# Patient Record
Sex: FEMALE | Race: WHITE
[De-identification: names, ages, dates, MRNs, and addresses within clinical notes are randomized per-mention and may not be internally consistent; named-entity substitution may affect disease eponyms.]

---

## 2017-02-03 ENCOUNTER — APPOINTMENT (OUTPATIENT)
Dept: FAMILY MEDICINE | Facility: CLINIC | Age: 62
End: 2017-02-03

## 2017-02-03 VITALS
HEART RATE: 76 BPM | BODY MASS INDEX: 19.9 KG/M2 | HEIGHT: 65 IN | SYSTOLIC BLOOD PRESSURE: 90 MMHG | OXYGEN SATURATION: 98 % | DIASTOLIC BLOOD PRESSURE: 66 MMHG | RESPIRATION RATE: 16 BRPM | TEMPERATURE: 98.2 F | WEIGHT: 119.44 LBS

## 2018-03-12 ENCOUNTER — APPOINTMENT (OUTPATIENT)
Dept: FAMILY MEDICINE | Facility: CLINIC | Age: 63
End: 2018-03-12
Payer: COMMERCIAL

## 2018-03-12 VITALS
HEIGHT: 65 IN | OXYGEN SATURATION: 98 % | SYSTOLIC BLOOD PRESSURE: 102 MMHG | WEIGHT: 120.25 LBS | DIASTOLIC BLOOD PRESSURE: 68 MMHG | RESPIRATION RATE: 16 BRPM | BODY MASS INDEX: 20.03 KG/M2 | HEART RATE: 68 BPM

## 2018-03-12 DIAGNOSIS — J06.9 ACUTE UPPER RESPIRATORY INFECTION, UNSPECIFIED: ICD-10-CM

## 2018-03-12 PROCEDURE — 99214 OFFICE O/P EST MOD 30 MIN: CPT

## 2018-03-12 RX ORDER — FLUTICASONE PROPIONATE 50 UG/1
50 SPRAY, METERED NASAL TWICE DAILY
Qty: 1 | Refills: 1 | Status: COMPLETED | COMMUNITY
Start: 2017-02-03 | End: 2018-03-12

## 2018-05-09 ENCOUNTER — FORM ENCOUNTER (OUTPATIENT)
Age: 63
End: 2018-05-09

## 2018-05-10 ENCOUNTER — APPOINTMENT (OUTPATIENT)
Dept: MAMMOGRAPHY | Facility: CLINIC | Age: 63
End: 2018-05-10

## 2018-05-10 ENCOUNTER — OUTPATIENT (OUTPATIENT)
Dept: OUTPATIENT SERVICES | Facility: HOSPITAL | Age: 63
LOS: 1 days | End: 2018-05-10
Payer: COMMERCIAL

## 2018-05-10 DIAGNOSIS — Z00.8 ENCOUNTER FOR OTHER GENERAL EXAMINATION: ICD-10-CM

## 2018-05-10 PROCEDURE — 77063 BREAST TOMOSYNTHESIS BI: CPT | Mod: 26

## 2018-05-10 PROCEDURE — 77063 BREAST TOMOSYNTHESIS BI: CPT

## 2018-05-10 PROCEDURE — 77067 SCR MAMMO BI INCL CAD: CPT | Mod: 26

## 2018-05-10 PROCEDURE — 77067 SCR MAMMO BI INCL CAD: CPT

## 2019-03-01 ENCOUNTER — APPOINTMENT (OUTPATIENT)
Dept: FAMILY MEDICINE | Facility: CLINIC | Age: 64
End: 2019-03-01
Payer: COMMERCIAL

## 2019-03-01 VITALS
HEIGHT: 65 IN | DIASTOLIC BLOOD PRESSURE: 70 MMHG | SYSTOLIC BLOOD PRESSURE: 118 MMHG | WEIGHT: 118.5 LBS | BODY MASS INDEX: 19.74 KG/M2 | RESPIRATION RATE: 16 BRPM | TEMPERATURE: 97.7 F | HEART RATE: 70 BPM | OXYGEN SATURATION: 100 %

## 2019-03-01 DIAGNOSIS — R05 COUGH: ICD-10-CM

## 2019-03-01 PROCEDURE — 99214 OFFICE O/P EST MOD 30 MIN: CPT

## 2019-03-03 NOTE — PHYSICAL EXAM
[No Acute Distress] : no acute distress [Well Nourished] : well nourished [Well Developed] : well developed [Well-Appearing] : well-appearing [Normal Voice/Communication] : normal voice/communication [Normal Sclera/Conjunctiva] : normal sclera/conjunctiva [Normal Outer Ear/Nose] : the outer ears and nose were normal in appearance [Normal TMs] : both tympanic membranes were normal [No JVD] : no jugular venous distention [No Respiratory Distress] : no respiratory distress  [Clear to Auscultation] : lungs were clear to auscultation bilaterally [No Accessory Muscle Use] : no accessory muscle use [Normal Rate] : normal rate  [Regular Rhythm] : with a regular rhythm [Normal S1, S2] : normal S1 and S2 [Normal Supraclavicular Nodes] : no supraclavicular lymphadenopathy [Normal Anterior Cervical Nodes] : no anterior cervical lymphadenopathy [Normal Gait] : normal gait [Speech Grossly Normal] : speech grossly normal [Memory Grossly Normal] : memory grossly normal [Normal Affect] : the affect was normal [Alert and Oriented x3] : oriented to person, place, and time [Normal Insight/Judgement] : insight and judgment were intact [de-identified] : sl erythema o/p, no tonsillar exudates

## 2019-03-03 NOTE — ASSESSMENT
[FreeTextEntry1] : continue supportive care\par Return to office if you feel worse or do not feel better\par

## 2019-03-03 NOTE — HISTORY OF PRESENT ILLNESS
[FreeTextEntry8] : Patient presents stating she started with flu like synpotoms 12 days ago, sore throat, chest congestion. Better in day than at night. No flu shot this year. No facial pain. +Nasal congestion. OTC: advil, nyquil. dry cough persists now, overall feeling better since start of illness. No fever, no n/v/d.  \par \par ROS: negative except as noted above\par

## 2021-01-16 ENCOUNTER — TRANSCRIPTION ENCOUNTER (OUTPATIENT)
Age: 66
End: 2021-01-16

## 2021-08-12 ENCOUNTER — APPOINTMENT (OUTPATIENT)
Dept: FAMILY MEDICINE | Facility: CLINIC | Age: 66
End: 2021-08-12
Payer: COMMERCIAL

## 2021-08-12 VITALS
HEART RATE: 73 BPM | TEMPERATURE: 97.6 F | WEIGHT: 112 LBS | RESPIRATION RATE: 16 BRPM | SYSTOLIC BLOOD PRESSURE: 120 MMHG | DIASTOLIC BLOOD PRESSURE: 80 MMHG | HEIGHT: 65 IN | BODY MASS INDEX: 18.66 KG/M2 | OXYGEN SATURATION: 97 %

## 2021-08-12 DIAGNOSIS — F41.9 ANXIETY DISORDER, UNSPECIFIED: ICD-10-CM

## 2021-08-12 PROCEDURE — 99214 OFFICE O/P EST MOD 30 MIN: CPT

## 2021-08-13 RX ORDER — BENZONATATE 100 MG/1
100 CAPSULE ORAL
Qty: 30 | Refills: 1 | Status: DISCONTINUED | COMMUNITY
Start: 2019-03-01 | End: 2021-08-13

## 2021-08-13 NOTE — PHYSICAL EXAM
[No Acute Distress] : no acute distress [Well Nourished] : well nourished [Well Developed] : well developed [Well-Appearing] : well-appearing [Normal Sclera/Conjunctiva] : normal sclera/conjunctiva [EOMI] : extraocular movements intact [Normal Outer Ear/Nose] : the outer ears and nose were normal in appearance [No Respiratory Distress] : no respiratory distress  [No Accessory Muscle Use] : no accessory muscle use [Clear to Auscultation] : lungs were clear to auscultation bilaterally [Normal Rate] : normal rate  [Regular Rhythm] : with a regular rhythm

## 2021-08-16 LAB
25(OH)D3 SERPL-MCNC: 74.8 NG/ML
ALBUMIN SERPL ELPH-MCNC: 5.2 G/DL
ALP BLD-CCNC: 96 U/L
ALT SERPL-CCNC: 31 U/L
ANION GAP SERPL CALC-SCNC: 12 MMOL/L
AST SERPL-CCNC: 34 U/L
BASOPHILS # BLD AUTO: 0.02 K/UL
BASOPHILS NFR BLD AUTO: 0.3 %
BILIRUB SERPL-MCNC: 0.8 MG/DL
BUN SERPL-MCNC: 19 MG/DL
CALCIUM SERPL-MCNC: 10.7 MG/DL
CHLORIDE SERPL-SCNC: 102 MMOL/L
CHOLEST SERPL-MCNC: 258 MG/DL
CO2 SERPL-SCNC: 26 MMOL/L
COVID-19 NUCLEOCAPSID  GAM ANTIBODY INTERPRETATION: POSITIVE
CREAT SERPL-MCNC: 0.71 MG/DL
EOSINOPHIL # BLD AUTO: 0.07 K/UL
EOSINOPHIL NFR BLD AUTO: 1 %
ESTIMATED AVERAGE GLUCOSE: 108 MG/DL
GLUCOSE SERPL-MCNC: 99 MG/DL
HBA1C MFR BLD HPLC: 5.4 %
HCT VFR BLD CALC: 43.5 %
HDLC SERPL-MCNC: 102 MG/DL
HGB BLD-MCNC: 14.4 G/DL
IMM GRANULOCYTES NFR BLD AUTO: 0.1 %
LDLC SERPL CALC-MCNC: 141 MG/DL
LYMPHOCYTES # BLD AUTO: 2.28 K/UL
LYMPHOCYTES NFR BLD AUTO: 31.4 %
MAN DIFF?: NORMAL
MCHC RBC-ENTMCNC: 30.8 PG
MCHC RBC-ENTMCNC: 33.1 GM/DL
MCV RBC AUTO: 93.1 FL
MONOCYTES # BLD AUTO: 0.63 K/UL
MONOCYTES NFR BLD AUTO: 8.7 %
NEUTROPHILS # BLD AUTO: 4.25 K/UL
NEUTROPHILS NFR BLD AUTO: 58.5 %
NONHDLC SERPL-MCNC: 155 MG/DL
PLATELET # BLD AUTO: 382 K/UL
POTASSIUM SERPL-SCNC: 5.1 MMOL/L
PROT SERPL-MCNC: 7.7 G/DL
RBC # BLD: 4.67 M/UL
RBC # FLD: 12.5 %
SARS-COV-2 AB SERPL QL IA: 68.5 INDEX
SODIUM SERPL-SCNC: 140 MMOL/L
TRIGL SERPL-MCNC: 71 MG/DL
WBC # FLD AUTO: 7.26 K/UL

## 2021-08-17 ENCOUNTER — NON-APPOINTMENT (OUTPATIENT)
Age: 66
End: 2021-08-17

## 2021-08-18 ENCOUNTER — NON-APPOINTMENT (OUTPATIENT)
Age: 66
End: 2021-08-18

## 2021-08-18 NOTE — PLAN
[FreeTextEntry1] : 66 year old female presents to re-establish care. \par \par #Health Maintenance\par Given mammogram referral and collected maintenance labs\par She will schedule follow-up appointment for a complete physical exam. \par \par #Travel\par sent refill of clonazepam 0.5 to use during flight. \par She uses medication only with travel. iSTOP was checked and cleared.\par \par #History of COVID\par Patient reports she had covid and received monoclonal antibody transfusion\par Sent for COVID nucleocapsid antibody test \par

## 2021-08-18 NOTE — HISTORY OF PRESENT ILLNESS
[FreeTextEntry8] : 66 years old female  [FreeTextEntry1] : Would like medication for travel and Covid antibody test. [de-identified] : 66 year old female presents to re-establish care. \par \par #Health Maintenance\par Needs mammogram and labs to review at next patient visit for CPE.\par \par #Travel\par Patient would like refill of clonazepam 0.5 to use during flight. \par She uses medication only with travel. She brought in pill bottle from 2019 that was prescribed for same indication.\par \par #History of COVID\par Patient reports she had covid and received monoclonal antibody transfusion\par She would like confirmation of antibody levels\par

## 2021-08-18 NOTE — REVIEW OF SYSTEMS
[Fever] : no fever [Chills] : no chills [Fatigue] : no fatigue [Night Sweats] : no night sweats [Recent Change In Weight] : ~T no recent weight change [Discharge] : no discharge [Shortness Of Breath] : no shortness of breath [Wheezing] : no wheezing [Cough] : no cough [Abdominal Pain] : no abdominal pain [Constipation] : no constipation [Diarrhea] : diarrhea [Vomiting] : no vomiting [Muscle Weakness] : no muscle weakness [Headache] : no headache [Dizziness] : no dizziness

## 2021-08-18 NOTE — HISTORY OF PRESENT ILLNESS
[FreeTextEntry8] : 66 years old female  [FreeTextEntry1] : Would like medication for travel and Covid antibody test. [de-identified] : 66 year old female presents to re-establish care. \par \par #Health Maintenance\par Needs mammogram and labs to review at next patient visit for CPE.\par \par #Travel\par Patient would like refill of clonazepam 0.5 to use during flight. \par She uses medication only with travel. She brought in pill bottle from 2019 that was prescribed for same indication.\par \par #History of COVID\par Patient reports she had covid and received monoclonal antibody transfusion\par She would like confirmation of antibody levels\par

## 2021-08-20 ENCOUNTER — NON-APPOINTMENT (OUTPATIENT)
Age: 66
End: 2021-08-20

## 2021-08-20 DIAGNOSIS — Z86.16 PERSONAL HISTORY OF COVID-19: ICD-10-CM

## 2021-08-20 DIAGNOSIS — Z00.00 ENCOUNTER FOR GENERAL ADULT MEDICAL EXAMINATION W/OUT ABNORMAL FINDINGS: ICD-10-CM

## 2021-08-20 DIAGNOSIS — B34.9 VIRAL INFECTION, UNSPECIFIED: ICD-10-CM

## 2023-04-03 ENCOUNTER — APPOINTMENT (OUTPATIENT)
Dept: FAMILY MEDICINE | Facility: CLINIC | Age: 68
End: 2023-04-03

## 2025-01-16 ENCOUNTER — APPOINTMENT (OUTPATIENT)
Dept: ORTHOPEDIC SURGERY | Facility: CLINIC | Age: 70
End: 2025-01-16
Payer: MEDICARE

## 2025-01-16 VITALS — HEIGHT: 64 IN | BODY MASS INDEX: 19.46 KG/M2 | WEIGHT: 114 LBS

## 2025-01-16 DIAGNOSIS — M16.12 UNILATERAL PRIMARY OSTEOARTHRITIS, LEFT HIP: ICD-10-CM

## 2025-01-16 PROCEDURE — 99205 OFFICE O/P NEW HI 60 MIN: CPT

## 2025-03-05 VITALS
HEART RATE: 68 BPM | HEIGHT: 64 IN | RESPIRATION RATE: 16 BRPM | SYSTOLIC BLOOD PRESSURE: 133 MMHG | WEIGHT: 112.44 LBS | TEMPERATURE: 98 F | OXYGEN SATURATION: 100 % | DIASTOLIC BLOOD PRESSURE: 75 MMHG

## 2025-03-05 RX ORDER — POVIDONE-IODINE 7.5 %
1 SOLUTION, NON-ORAL TOPICAL ONCE
Refills: 0 | Status: COMPLETED | OUTPATIENT
Start: 2025-03-06 | End: 2025-03-06

## 2025-03-05 NOTE — H&P ADULT - NSICDXPASTSURGICALHX_GEN_ALL_CORE_FT
PAST SURGICAL HISTORY:  H/O hernia repair 2024    H/O: hysterectomy     History of Rhinoplasty @ 32 y/o     History of Tonsillectomy 5 y/o     S/P Dilation and Curettage  in 30's

## 2025-03-05 NOTE — H&P ADULT - HISTORY OF PRESENT ILLNESS
70yo f c/o left hip pain x   Presents today for elective LEFT THR.   Has the patient used any narcotic more than 90 days prior to the date of surgery? YES or NO  70yo f c/o left hip pain x months. Reports tennis injury where she injured her hip. Attempted several conservative therapies including PT, activity modification. Patient taking antiinflammatories without relief. Patient ambulating with a cane for support. Denies h/o blood clots, recent hospitalization, use of antibiotics.   Presents today for elective left total hip replacement with Dr. Romero   Has the patient used any narcotic more than 90 days prior to the date of surgery?  NO

## 2025-03-05 NOTE — ASU PATIENT PROFILE, ADULT - FALL HARM RISK - RISK INTERVENTIONS

## 2025-03-05 NOTE — ASU PATIENT PROFILE, ADULT - NSICDXPASTMEDICALHX_GEN_ALL_CORE_FT
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

## 2025-03-05 NOTE — H&P ADULT - PROBLEM SELECTOR PLAN 1
Admit to Orthopaedic Service.  Presents today for elective LEFT THR   Pt medically stable and cleared for procedure today by Dr. Hameed

## 2025-03-05 NOTE — H&P ADULT - NSHPLABSRESULTS_GEN_ALL_CORE
preop cbc/bmp/coags/ua wnl per medical clearance  border line liver enzymes and LDL recommended to repeat labs   Cr 0.68  H/H 14.9/44.2  HGA1C 5.4  EKG- NSR

## 2025-03-05 NOTE — H&P ADULT - NSHPPHYSICALEXAM_GEN_ALL_CORE
General:  PE:  Decreased ROM secondary to pain. Rest of PE per medical clearance. Gen: 68 y/o female, NAD  MSK: decreased ROM 2/2 pain at the left hip     Motor: quad/TA/GS/EHL/FHl 5/5 bilateral lower extremities  Sensation: intact to light touch throughout bilateral lower extremities  Pulses: 2+ DP pulses bilaterally, extremities warm and well perfused, capillary refill brisk     Remainder of exam per medical clearance note

## 2025-03-05 NOTE — ASU PATIENT PROFILE, ADULT - NSICDXPASTSURGICALHX_GEN_ALL_CORE_FT
PAST SURGICAL HISTORY:  H/O: hysterectomy     History of Rhinoplasty @ 30 y/o     History of Tonsillectomy 7 y/o     S/P Dilation and Curettage  in 30's      PAST SURGICAL HISTORY:  H/O hernia repair 2024    H/O: hysterectomy     History of Rhinoplasty @ 32 y/o     History of Tonsillectomy 5 y/o     S/P Dilation and Curettage  in 30's

## 2025-03-06 ENCOUNTER — INPATIENT (INPATIENT)
Facility: HOSPITAL | Age: 70
LOS: 0 days | Discharge: HOME HEALTH | End: 2025-03-07
Attending: ORTHOPAEDIC SURGERY | Admitting: ORTHOPAEDIC SURGERY
Payer: MEDICARE

## 2025-03-06 ENCOUNTER — TRANSCRIPTION ENCOUNTER (OUTPATIENT)
Age: 70
End: 2025-03-06

## 2025-03-06 DIAGNOSIS — E78.5 HYPERLIPIDEMIA, UNSPECIFIED: ICD-10-CM

## 2025-03-06 DIAGNOSIS — Z90.710 ACQUIRED ABSENCE OF BOTH CERVIX AND UTERUS: Chronic | ICD-10-CM

## 2025-03-06 DIAGNOSIS — M19.90 UNSPECIFIED OSTEOARTHRITIS, UNSPECIFIED SITE: ICD-10-CM

## 2025-03-06 DIAGNOSIS — Z98.890 OTHER SPECIFIED POSTPROCEDURAL STATES: Chronic | ICD-10-CM

## 2025-03-06 PROCEDURE — 72170 X-RAY EXAM OF PELVIS: CPT | Mod: 26

## 2025-03-06 DEVICE — OXINIUM 28MM FERMORAL HEAD ZR 2.5 NB: Type: IMPLANTABLE DEVICE | Site: LEFT HIP | Status: FUNCTIONAL

## 2025-03-06 DEVICE — SHELL ACET R3 POLY STD 3 HOLE 52MM: Type: IMPLANTABLE DEVICE | Site: LEFT HIP | Status: FUNCTIONAL

## 2025-03-06 DEVICE — INSERT ACET OR3O DUAL MOBILITY 28/40MM: Type: IMPLANTABLE DEVICE | Site: LEFT HIP | Status: FUNCTIONAL

## 2025-03-06 DEVICE — IMPLANTABLE DEVICE: Type: IMPLANTABLE DEVICE | Site: LEFT HIP | Status: FUNCTIONAL

## 2025-03-06 DEVICE — LINER ACET OR3O DM XLPE 40/52MM: Type: IMPLANTABLE DEVICE | Site: LEFT HIP | Status: FUNCTIONAL

## 2025-03-06 RX ORDER — CELECOXIB 50 MG/1
200 CAPSULE ORAL EVERY 12 HOURS
Refills: 0 | Status: DISCONTINUED | OUTPATIENT
Start: 2025-03-07 | End: 2025-03-07

## 2025-03-06 RX ORDER — MAGNESIUM HYDROXIDE 400 MG/5ML
30 SUSPENSION ORAL DAILY
Refills: 0 | Status: DISCONTINUED | OUTPATIENT
Start: 2025-03-06 | End: 2025-03-07

## 2025-03-06 RX ORDER — APREPITANT 40 MG/1
40 CAPSULE ORAL ONCE
Refills: 0 | Status: COMPLETED | OUTPATIENT
Start: 2025-03-06 | End: 2025-03-06

## 2025-03-06 RX ORDER — SODIUM CHLORIDE 9 G/1000ML
1000 INJECTION, SOLUTION INTRAVENOUS
Refills: 0 | Status: DISCONTINUED | OUTPATIENT
Start: 2025-03-06 | End: 2025-03-07

## 2025-03-06 RX ORDER — HYDROMORPHONE/SOD CHLOR,ISO/PF 2 MG/10 ML
0.5 SYRINGE (ML) INJECTION
Refills: 0 | Status: COMPLETED | OUTPATIENT
Start: 2025-03-06 | End: 2025-03-06

## 2025-03-06 RX ORDER — ACETAMINOPHEN 500 MG/5ML
1000 LIQUID (ML) ORAL ONCE
Refills: 0 | Status: COMPLETED | OUTPATIENT
Start: 2025-03-06 | End: 2025-03-06

## 2025-03-06 RX ORDER — CLONAZEPAM 0.5 MG/1
1 TABLET ORAL
Refills: 0 | DISCHARGE

## 2025-03-06 RX ORDER — OXYCODONE HYDROCHLORIDE 30 MG/1
5 TABLET ORAL EVERY 4 HOURS
Refills: 0 | Status: DISCONTINUED | OUTPATIENT
Start: 2025-03-06 | End: 2025-03-07

## 2025-03-06 RX ORDER — ONDANSETRON HCL/PF 4 MG/2 ML
4 VIAL (ML) INJECTION EVERY 6 HOURS
Refills: 0 | Status: DISCONTINUED | OUTPATIENT
Start: 2025-03-06 | End: 2025-03-07

## 2025-03-06 RX ORDER — HYDROMORPHONE/SOD CHLOR,ISO/PF 2 MG/10 ML
0.5 SYRINGE (ML) INJECTION
Refills: 0 | Status: DISCONTINUED | OUTPATIENT
Start: 2025-03-06 | End: 2025-03-06

## 2025-03-06 RX ORDER — CLONAZEPAM 0.5 MG/1
0.5 TABLET ORAL DAILY
Refills: 0 | Status: DISCONTINUED | OUTPATIENT
Start: 2025-03-06 | End: 2025-03-07

## 2025-03-06 RX ORDER — ASPIRIN 325 MG
81 TABLET ORAL EVERY 12 HOURS
Refills: 0 | Status: DISCONTINUED | OUTPATIENT
Start: 2025-03-06 | End: 2025-03-07

## 2025-03-06 RX ORDER — SENNA 187 MG
2 TABLET ORAL AT BEDTIME
Refills: 0 | Status: DISCONTINUED | OUTPATIENT
Start: 2025-03-06 | End: 2025-03-07

## 2025-03-06 RX ORDER — CEFAZOLIN SODIUM IN 0.9 % NACL 3 G/100 ML
2000 INTRAVENOUS SOLUTION, PIGGYBACK (ML) INTRAVENOUS EVERY 8 HOURS
Refills: 0 | Status: COMPLETED | OUTPATIENT
Start: 2025-03-06 | End: 2025-03-07

## 2025-03-06 RX ORDER — OXYCODONE HYDROCHLORIDE 30 MG/1
10 TABLET ORAL EVERY 4 HOURS
Refills: 0 | Status: DISCONTINUED | OUTPATIENT
Start: 2025-03-06 | End: 2025-03-07

## 2025-03-06 RX ORDER — HYDROMORPHONE/SOD CHLOR,ISO/PF 2 MG/10 ML
0.5 SYRINGE (ML) INJECTION EVERY 4 HOURS
Refills: 0 | Status: DISCONTINUED | OUTPATIENT
Start: 2025-03-06 | End: 2025-03-07

## 2025-03-06 RX ORDER — CELECOXIB 50 MG/1
200 CAPSULE ORAL ONCE
Refills: 0 | Status: COMPLETED | OUTPATIENT
Start: 2025-03-06 | End: 2025-03-06

## 2025-03-06 RX ORDER — POLYETHYLENE GLYCOL 3350 17 G/17G
17 POWDER, FOR SOLUTION ORAL AT BEDTIME
Refills: 0 | Status: DISCONTINUED | OUTPATIENT
Start: 2025-03-06 | End: 2025-03-07

## 2025-03-06 RX ORDER — ACETAMINOPHEN 500 MG/5ML
1000 LIQUID (ML) ORAL EVERY 8 HOURS
Refills: 0 | Status: DISCONTINUED | OUTPATIENT
Start: 2025-03-06 | End: 2025-03-07

## 2025-03-06 RX ORDER — CYCLOSPORINE OPHTHALMIC SOLUTION 1 MG/ML
1 SOLUTION/ DROPS OPHTHALMIC
Refills: 0 | DISCHARGE

## 2025-03-06 RX ADMIN — Medication 2 TABLET(S): at 22:45

## 2025-03-06 RX ADMIN — Medication 4 MILLIGRAM(S): at 17:12

## 2025-03-06 RX ADMIN — Medication 0.5 MILLIGRAM(S): at 17:51

## 2025-03-06 RX ADMIN — OXYCODONE HYDROCHLORIDE 10 MILLIGRAM(S): 30 TABLET ORAL at 19:18

## 2025-03-06 RX ADMIN — CELECOXIB 200 MILLIGRAM(S): 50 CAPSULE ORAL at 09:49

## 2025-03-06 RX ADMIN — OXYCODONE HYDROCHLORIDE 10 MILLIGRAM(S): 30 TABLET ORAL at 19:59

## 2025-03-06 RX ADMIN — Medication 1 APPLICATION(S): at 10:04

## 2025-03-06 RX ADMIN — Medication 1000 MILLIGRAM(S): at 16:31

## 2025-03-06 RX ADMIN — Medication 1000 MILLIGRAM(S): at 23:19

## 2025-03-06 RX ADMIN — Medication 81 MILLIGRAM(S): at 22:45

## 2025-03-06 RX ADMIN — Medication 0.5 MILLIGRAM(S): at 14:29

## 2025-03-06 RX ADMIN — OXYCODONE HYDROCHLORIDE 10 MILLIGRAM(S): 30 TABLET ORAL at 23:19

## 2025-03-06 RX ADMIN — APREPITANT 40 MILLIGRAM(S): 40 CAPSULE ORAL at 09:49

## 2025-03-06 RX ADMIN — Medication 0.5 MILLIGRAM(S): at 15:00

## 2025-03-06 RX ADMIN — Medication 1 APPLICATION(S): at 09:31

## 2025-03-06 RX ADMIN — Medication 1000 MILLIGRAM(S): at 15:56

## 2025-03-06 RX ADMIN — POLYETHYLENE GLYCOL 3350 17 GRAM(S): 17 POWDER, FOR SOLUTION ORAL at 22:45

## 2025-03-06 RX ADMIN — Medication 100 MILLIGRAM(S): at 19:18

## 2025-03-06 RX ADMIN — Medication 1000 MILLIGRAM(S): at 09:50

## 2025-03-06 NOTE — PHYSICAL THERAPY INITIAL EVALUATION ADULT - GENERAL OBSERVATIONS, REHAB EVAL
Pt received semi supine in bed +hep lock +tele +SCD +ressing on L hip CDI. Pt amb 20 ft with RW CGA.

## 2025-03-06 NOTE — PHYSICAL THERAPY INITIAL EVALUATION ADULT - PERTINENT HX OF CURRENT PROBLEM, REHAB EVAL
70yo f c/o left hip pain x months. Reports tennis injury where she injured her hip. Attempted several conservative therapies including PT, activity modification. Patient taking antiinflammatories without relief. Patient ambulating with a cane for support. Denies h/o blood clots, recent hospitalization, use of antibiotics.   Presents today for elective left total hip replacement with Dr. Romero   Has the patient used any narcotic more than 90 days prior to the date of surgery?  NO

## 2025-03-06 NOTE — PHYSICAL THERAPY INITIAL EVALUATION ADULT - ADDITIONAL COMMENTS
Pt states she lives in private house with . Pt has 1 FOS inside and multiple steps throughout the house. pt has SC but was independent with amb and ADLs PTA.

## 2025-03-06 NOTE — PRE-ANESTHESIA EVALUATION ADULT - NSANTHAGERD_ENT_A_CORE
Have You Had Botox Before?: has had botox Additional History: Pain 0/10 When Was Your Last Botox Treatment?: 05/01/2019 Yes

## 2025-03-06 NOTE — PHYSICAL THERAPY INITIAL EVALUATION ADULT - DIAGNOSIS, PT EVAL
4H: Impaired Joint Mobility, Motor Function, Muscle Performance, and Range of Motion Associated with Joint Arthroplasty 28-Oct-2018 17:04

## 2025-03-07 VITALS
OXYGEN SATURATION: 98 % | SYSTOLIC BLOOD PRESSURE: 101 MMHG | RESPIRATION RATE: 17 BRPM | DIASTOLIC BLOOD PRESSURE: 64 MMHG | HEART RATE: 65 BPM | TEMPERATURE: 98 F

## 2025-03-07 LAB
ANION GAP SERPL CALC-SCNC: 11 MMOL/L — SIGNIFICANT CHANGE UP (ref 5–17)
BUN SERPL-MCNC: 7 MG/DL — SIGNIFICANT CHANGE UP (ref 7–23)
CALCIUM SERPL-MCNC: 9.4 MG/DL — SIGNIFICANT CHANGE UP (ref 8.4–10.5)
CHLORIDE SERPL-SCNC: 103 MMOL/L — SIGNIFICANT CHANGE UP (ref 96–108)
CO2 SERPL-SCNC: 26 MMOL/L — SIGNIFICANT CHANGE UP (ref 22–31)
CREAT SERPL-MCNC: 0.5 MG/DL — SIGNIFICANT CHANGE UP (ref 0.5–1.3)
EGFR: 102 ML/MIN/1.73M2 — SIGNIFICANT CHANGE UP
EGFR: 102 ML/MIN/1.73M2 — SIGNIFICANT CHANGE UP
GLUCOSE SERPL-MCNC: 109 MG/DL — HIGH (ref 70–99)
HCT VFR BLD CALC: 35.7 % — SIGNIFICANT CHANGE UP (ref 34.5–45)
HGB BLD-MCNC: 12.1 G/DL — SIGNIFICANT CHANGE UP (ref 11.5–15.5)
MCHC RBC-ENTMCNC: 31.2 PG — SIGNIFICANT CHANGE UP (ref 27–34)
MCHC RBC-ENTMCNC: 33.9 G/DL — SIGNIFICANT CHANGE UP (ref 32–36)
MCV RBC AUTO: 92 FL — SIGNIFICANT CHANGE UP (ref 80–100)
NRBC BLD AUTO-RTO: 0 /100 WBCS — SIGNIFICANT CHANGE UP (ref 0–0)
PLATELET # BLD AUTO: 281 K/UL — SIGNIFICANT CHANGE UP (ref 150–400)
POTASSIUM SERPL-MCNC: 4.3 MMOL/L — SIGNIFICANT CHANGE UP (ref 3.5–5.3)
POTASSIUM SERPL-SCNC: 4.3 MMOL/L — SIGNIFICANT CHANGE UP (ref 3.5–5.3)
RBC # BLD: 3.88 M/UL — SIGNIFICANT CHANGE UP (ref 3.8–5.2)
RBC # FLD: 12.7 % — SIGNIFICANT CHANGE UP (ref 10.3–14.5)
SODIUM SERPL-SCNC: 140 MMOL/L — SIGNIFICANT CHANGE UP (ref 135–145)
WBC # BLD: 13.54 K/UL — HIGH (ref 3.8–10.5)
WBC # FLD AUTO: 13.54 K/UL — HIGH (ref 3.8–10.5)

## 2025-03-07 PROCEDURE — 80048 BASIC METABOLIC PNL TOTAL CA: CPT

## 2025-03-07 PROCEDURE — 72170 X-RAY EXAM OF PELVIS: CPT

## 2025-03-07 PROCEDURE — 85027 COMPLETE CBC AUTOMATED: CPT

## 2025-03-07 PROCEDURE — 36415 COLL VENOUS BLD VENIPUNCTURE: CPT

## 2025-03-07 PROCEDURE — 97116 GAIT TRAINING THERAPY: CPT

## 2025-03-07 PROCEDURE — 99222 1ST HOSP IP/OBS MODERATE 55: CPT

## 2025-03-07 PROCEDURE — 97110 THERAPEUTIC EXERCISES: CPT

## 2025-03-07 PROCEDURE — C1776: CPT

## 2025-03-07 PROCEDURE — 97165 OT EVAL LOW COMPLEX 30 MIN: CPT

## 2025-03-07 PROCEDURE — 97162 PT EVAL MOD COMPLEX 30 MIN: CPT

## 2025-03-07 RX ORDER — NALOXONE HYDROCHLORIDE 0.4 MG/ML
1 INJECTION, SOLUTION INTRAMUSCULAR; INTRAVENOUS; SUBCUTANEOUS
Qty: 1 | Refills: 0
Start: 2025-03-07

## 2025-03-07 RX ORDER — ASPIRIN 325 MG
1 TABLET ORAL
Qty: 0 | Refills: 0 | DISCHARGE
Start: 2025-03-07

## 2025-03-07 RX ORDER — ACETAMINOPHEN 500 MG/5ML
2 LIQUID (ML) ORAL
Qty: 0 | Refills: 0 | DISCHARGE
Start: 2025-03-07

## 2025-03-07 RX ORDER — ACETAMINOPHEN 500 MG/5ML
2 LIQUID (ML) ORAL
Qty: 42 | Refills: 0
Start: 2025-03-07 | End: 2025-03-13

## 2025-03-07 RX ORDER — NALOXONE HYDROCHLORIDE 0.4 MG/ML
1 INJECTION, SOLUTION INTRAMUSCULAR; INTRAVENOUS; SUBCUTANEOUS
Qty: 1 | Refills: 0
Start: 2025-03-07 | End: 2025-03-07

## 2025-03-07 RX ORDER — OXYCODONE HYDROCHLORIDE 30 MG/1
1 TABLET ORAL
Qty: 0 | Refills: 0 | DISCHARGE
Start: 2025-03-07

## 2025-03-07 RX ORDER — ASPIRIN 325 MG
1 TABLET ORAL
Qty: 60 | Refills: 0
Start: 2025-03-07 | End: 2025-04-05

## 2025-03-07 RX ORDER — CELECOXIB 50 MG/1
1 CAPSULE ORAL
Qty: 0 | Refills: 0 | DISCHARGE
Start: 2025-03-07

## 2025-03-07 RX ORDER — MELOXICAM 15 MG/1
1 TABLET ORAL
Refills: 0 | DISCHARGE

## 2025-03-07 RX ORDER — CELECOXIB 50 MG/1
1 CAPSULE ORAL
Qty: 14 | Refills: 0
Start: 2025-03-07 | End: 2025-03-13

## 2025-03-07 RX ORDER — OXYCODONE HYDROCHLORIDE 30 MG/1
1 TABLET ORAL
Qty: 42 | Refills: 0
Start: 2025-03-07 | End: 2025-03-13

## 2025-03-07 RX ORDER — POLYETHYLENE GLYCOL 3350 17 G/17G
17 POWDER, FOR SOLUTION ORAL
Qty: 0 | Refills: 0 | DISCHARGE
Start: 2025-03-07

## 2025-03-07 RX ADMIN — Medication 81 MILLIGRAM(S): at 08:01

## 2025-03-07 RX ADMIN — CELECOXIB 200 MILLIGRAM(S): 50 CAPSULE ORAL at 18:30

## 2025-03-07 RX ADMIN — OXYCODONE HYDROCHLORIDE 10 MILLIGRAM(S): 30 TABLET ORAL at 04:05

## 2025-03-07 RX ADMIN — Medication 4 MILLIGRAM(S): at 10:16

## 2025-03-07 RX ADMIN — CELECOXIB 200 MILLIGRAM(S): 50 CAPSULE ORAL at 18:15

## 2025-03-07 RX ADMIN — Medication 1000 MILLIGRAM(S): at 18:30

## 2025-03-07 RX ADMIN — Medication 1000 MILLIGRAM(S): at 18:15

## 2025-03-07 RX ADMIN — OXYCODONE HYDROCHLORIDE 10 MILLIGRAM(S): 30 TABLET ORAL at 00:19

## 2025-03-07 RX ADMIN — Medication 40 MILLIGRAM(S): at 05:38

## 2025-03-07 RX ADMIN — OXYCODONE HYDROCHLORIDE 10 MILLIGRAM(S): 30 TABLET ORAL at 18:15

## 2025-03-07 RX ADMIN — Medication 1000 MILLIGRAM(S): at 08:00

## 2025-03-07 RX ADMIN — OXYCODONE HYDROCHLORIDE 10 MILLIGRAM(S): 30 TABLET ORAL at 08:00

## 2025-03-07 RX ADMIN — OXYCODONE HYDROCHLORIDE 10 MILLIGRAM(S): 30 TABLET ORAL at 14:06

## 2025-03-07 RX ADMIN — OXYCODONE HYDROCHLORIDE 10 MILLIGRAM(S): 30 TABLET ORAL at 09:25

## 2025-03-07 RX ADMIN — OXYCODONE HYDROCHLORIDE 10 MILLIGRAM(S): 30 TABLET ORAL at 18:30

## 2025-03-07 RX ADMIN — OXYCODONE HYDROCHLORIDE 10 MILLIGRAM(S): 30 TABLET ORAL at 14:53

## 2025-03-07 RX ADMIN — Medication 1000 MILLIGRAM(S): at 09:25

## 2025-03-07 RX ADMIN — CELECOXIB 200 MILLIGRAM(S): 50 CAPSULE ORAL at 05:38

## 2025-03-07 RX ADMIN — Medication 100 MILLIGRAM(S): at 03:05

## 2025-03-07 RX ADMIN — OXYCODONE HYDROCHLORIDE 10 MILLIGRAM(S): 30 TABLET ORAL at 03:05

## 2025-03-07 NOTE — OCCUPATIONAL THERAPY INITIAL EVALUATION ADULT - GENERAL OBSERVATIONS, REHAB EVAL
PT Tyrell present. Patient  present. Patient received semisupine in bed +heplock IV, +L hip bandage C/D/I, +b/l SCD's, room air, c/o 1/10 L hip pain during mobility.

## 2025-03-07 NOTE — DISCHARGE NOTE PROVIDER - CARE PROVIDER_API CALL
Gil Romero  Orthopaedic Surgery  [address]  [place] [address]  Phone: [phone]  Fax: [phone]  Follow Up Time:

## 2025-03-07 NOTE — OCCUPATIONAL THERAPY INITIAL EVALUATION ADULT - MANUAL MUSCLE TESTING RESULTS, REHAB EVAL
BUE/BLE at least 3+/5 grossly based on functional mobility assessment against gravity except L hip flexion 2/2 pain

## 2025-03-07 NOTE — DISCHARGE NOTE NURSING/CASE MANAGEMENT/SOCIAL WORK - FINANCIAL ASSISTANCE
[facility] provides services at a reduced cost to those who are determined to be eligible through [facility]’s financial assistance program. Information regarding [facility]’s financial assistance program can be found by going to [email or web address] or by calling [phone].

## 2025-03-07 NOTE — DISCHARGE NOTE PROVIDER - NSDCCPTREATMENT_GEN_ALL_CORE_FT
PRINCIPAL PROCEDURE  Procedure: Hip replacement, total, left  Findings and Treatment: posterior approach

## 2025-03-07 NOTE — OCCUPATIONAL THERAPY INITIAL EVALUATION ADULT - ADDITIONAL COMMENTS
Patient reports living in a private home with  with no HALEY and 1 FOS to the bedroom and bathroom. Patient states she was independent with all ADL's, IADL's and functional mobility with SC. Patient is R hand dominant and has a walk in shower with shower chair.

## 2025-03-07 NOTE — DISCHARGE NOTE PROVIDER - NSDCMRMEDTOKEN_GEN_ALL_CORE_FT
acetaminophen 500 mg oral tablet: 2 tab(s) orally every 8 hours  aspirin 81 mg oral delayed release tablet: 1 tab(s) orally every 12 hours  celecoxib 200 mg oral capsule: 1 cap(s) orally every 12 hours  clonazePAM 0.5 mg oral tablet: 1 tab(s) orally once a day  oxyCODONE 10 mg oral tablet: 1 tab(s) orally every 4 hours As needed Severe Pain (7 - 10)  oxyCODONE 5 mg oral tablet: 1 tab(s) orally every 4 hours As needed Moderate Pain (4 - 6)  pantoprazole 40 mg oral delayed release tablet: 1 tab(s) orally once a day (before a meal)  polyethylene glycol 3350 oral powder for reconstitution: 17 gram(s) orally once a day (at bedtime)  Restasis 0.05% ophthalmic emulsion: 1 drop(s) in each affected eye once a day   acetaminophen 500 mg oral tablet: 2 tab(s) orally every 8 hours MDD: 6  aspirin 81 mg oral delayed release tablet: 1 tab(s) orally every 12 hours MDD: 2  celecoxib 200 mg oral capsule: 1 cap(s) orally every 12 hours MDD: 2  clonazePAM 0.5 mg oral tablet: 1 tab(s) orally once a day  naloxone 3 mg/0.1 mL nasal spray: 1 spray(s) intranasally once oversedation MDD: 1  oxyCODONE 5 mg oral tablet: 1 tab(s) orally every 4 hours as needed for  severe pain 1-2 tabs every 4 hours as needed for moderate to severe pain MDD: 8  pantoprazole 40 mg oral delayed release tablet: 1 tab(s) orally once a day (before a meal) MDD: 1  polyethylene glycol 3350 oral powder for reconstitution: 17 gram(s) orally once a day (at bedtime)  Restasis 0.05% ophthalmic emulsion: 1 drop(s) in each affected eye once a day   acetaminophen 500 mg oral tablet: 2 tab(s) orally every 8 hours MDD: 6  aspirin 81 mg oral delayed release tablet: 1 tab(s) orally every 12 hours MDD: 2  celecoxib 200 mg oral capsule: 1 cap(s) orally every 12 hours MDD: 2  clonazePAM 0.5 mg oral tablet: 1 tab(s) orally once a day  naloxone 4 mg/0.1 mL nasal spray: 1 spray(s) intranasally once for oversedation MDD: 1  oxyCODONE 5 mg oral tablet: 1 tab(s) orally every 4 hours as needed for  severe pain 1-2 tabs every 4 hours as needed for moderate to severe pain MDD: 8  pantoprazole 40 mg oral delayed release tablet: 1 tab(s) orally once a day (before a meal) MDD: 1  polyethylene glycol 3350 oral powder for reconstitution: 17 gram(s) orally once a day (at bedtime)  Restasis 0.05% ophthalmic emulsion: 1 drop(s) in each affected eye once a day

## 2025-03-07 NOTE — OCCUPATIONAL THERAPY INITIAL EVALUATION ADULT - LEVEL OF INDEPENDENCE: DRESS LOWER BODY, OT EVAL
patient fatigued, c/o of nausea post mobility, educated and trained on long handled AE for LLE dressing

## 2025-03-07 NOTE — DISCHARGE NOTE PROVIDER - NSDCFUADDINST_GEN_ALL_CORE_FT
ACTIVITY:   - Weight bear as tolerated with assistive device. No strenuous activity, heavy lifting, driving or returning to work until cleared by MD.   - Apply a cold compress to the surgical site several times daily to reduce pain and swelling. For icing, twenty-minute sessions followed by an hour off is recommended. You should ice as frequently as possible. Ice should NEVER be placed directly on the skin. Wearing compression stockings during the first week after surgery can help reduce swelling in your knee, calf and foot, but is not required.     DRESSING/SHOWERING:   AQUACEL – brown gel dressing  - You may shower, your dressing is water-resistant. Do not soak in bathtubs. Remove dressing after postop day 7, then leave incision open to air. You may do this yourself (simply peel it off), or you may ask for assistance from your visiting nurse. Keep your incision clean and dry. Do not pick at your incision. Do not apply creams, ointments or oils to your incision until cleared by your surgeon. Do not soak your incision in sitting water (ie tubs, pools, lakes, etc.) until cleared by your surgeon. Do not scrub the incision – instead, allow soap and water to flow over the incision and then pat it dry with a clean towel.     MEDICATION/ANTICOAGULATION:   -You have been prescribed Aspirin as a preventative to help prevent postoperative blood clots. Please take this medication as prescribed.    - You have been prescribed medications for pain:     - Tylenol for mild to moderate pain. Do not exceed 3,000mg daily.     - For more severe pain, take Tylenol with the addition of narcotic pain medication. Take this medication as prescribed. This medication may cause drowsiness or dizziness. Do not operate machinery. This medication may cause constipation.   - For any additional medications, follow instructions on the bottle.    -Try to have regular bowel movements. Take stool softener or laxative if necessary. You may wish to take Miralax daily until you have regular bowel movements.    - If you have been prescribed Aspirin or an anti-inflammatory, please take prilosec (omeprazole) once a day, before breakfast, until no longer taking Aspirin or anti-inflammatory. This will help protect your stomach.   - If you have a pain management physician, please follow-up with them postoperatively.    - If you experience any negative side effects of your medications, please call your surgeon's office to discuss.      FOLLOW-UP:   - Call to schedule an appt with Dr. Romero for follow up.   - Please follow-up with your primary care physician or any other specialist you see postoperatively, if needed.    - Contact your doctor or go to the emergency room if you experience: fever greater than 101.5, chills, chest pain, difficulty breathing, redness or excessive drainage around the incision, other concerns.

## 2025-03-07 NOTE — OCCUPATIONAL THERAPY INITIAL EVALUATION ADULT - REHAB POTENTIAL, OT EVAL
Patient demonstrates safe and independent performance of ADL's and functional mobility with RW and is d/c from skilled OT at this time.

## 2025-03-07 NOTE — DISCHARGE NOTE NURSING/CASE MANAGEMENT/SOCIAL WORK - PATIENT PORTAL LINK FT
You can access the FollowMyHealth Patient Portal offered by [facility] by registering at the following website: [email or web address]. By joining [employer]’s FollowMyHealth portal, you will also be able to view your health information using other applications (apps) compatible with our system.

## 2025-03-07 NOTE — CONSULT NOTE ADULT - SUBJECTIVE AND OBJECTIVE BOX
Admission H&P:  HPI:  68yo f c/o left hip pain x months. Reports tennis injury where she injured her hip. Attempted several conservative therapies including PT, activity modification. Patient taking antiinflammatories without relief. Patient ambulating with a cane for support. Denies h/o blood clots, recent hospitalization, use of antibiotics.   Presented for elective left total hip replacement with Dr. Romero   s/p  left NUNO on March 6th 2025    At time of evaluation by Internal Medicine Co-management service on 3.7.25 --- had some nausea in the morning after getting opiates; nausea resolved with antiemetic.     PAST MEDICAL & SURGICAL HISTORY:  Uterine Leiomyoma      Chronic Corneal Abrasion      Anxiety  Flying      HLD (hyperlipidemia)      History of Rhinoplasty @ 32 y/o      S/P Dilation and Curettage  in 30's      History of Tonsillectomy 5 y/o      H/O: hysterectomy      H/O hernia repair  2024        Home Medications:  clonazePAM 0.5 mg oral tablet: 1 tab(s) orally once a day (06 Mar 2025 09:43)  polyethylene glycol 3350 oral powder for reconstitution: 17 gram(s) orally once a day (at bedtime) (07 Mar 2025 10:40)  Restasis 0.05% ophthalmic emulsion: 1 drop(s) in each affected eye once a day (06 Mar 2025 09:43)    Allergies    itchy eyes  CATS (Rhinitis)  No Known Drug Allergies    Intolerances      FAMILY HISTORY:    Social History:      REVIEW OF SYSTEMS:  RESPIRATORY: No cough, No dyspnea  CARDIOVASCULAR: No chest pain, or leg swelling  GASTROINTESTINAL: no diarrhea  GENITOURINARY: No dysuria,   NEUROLOGICAL: No numbness, or tremors  ALLERGY AND IMMUNOLOGIC: No hives or eczema    Diet, Regular (03-06-25 @ 08:43) [Active]      CURRENT MEDICATIONS:   acetaminophen     Tablet .. 1000 milliGRAM(s) Oral every 8 hours  aspirin enteric coated 81 milliGRAM(s) Oral every 12 hours  celecoxib 200 milliGRAM(s) Oral every 12 hours  clonazePAM  Tablet 0.5 milliGRAM(s) Oral daily PRN  HYDROmorphone  Injectable 0.5 milliGRAM(s) IV Push every 4 hours PRN  lactated ringers. 1000 milliLiter(s) IV Continuous <Continuous>  magnesium hydroxide Suspension 30 milliLiter(s) Oral daily PRN  ondansetron Injectable 4 milliGRAM(s) IV Push every 6 hours PRN  oxyCODONE    IR 5 milliGRAM(s) Oral every 4 hours PRN  oxyCODONE    IR 10 milliGRAM(s) Oral every 4 hours PRN  pantoprazole    Tablet 40 milliGRAM(s) Oral before breakfast  polyethylene glycol 3350 17 Gram(s) Oral at bedtime  senna 2 Tablet(s) Oral at bedtime      VITAL SIGNS, INS/OUTS (last 24 hours):  Vital Signs Last 24 Hrs  T(C): 36.6 (07 Mar 2025 15:37), Max: 36.6 (07 Mar 2025 00:10)  T(F): 97.9 (07 Mar 2025 15:37), Max: 97.9 (07 Mar 2025 00:10)  HR: 65 (07 Mar 2025 15:37) (65 - 70)  BP: 101/64 (07 Mar 2025 15:37) (94/59 - 104/63)  BP(mean): --  RR: 17 (07 Mar 2025 15:37) (17 - 17)  SpO2: 98% (07 Mar 2025 15:37) (97% - 98%)    Parameters below as of 07 Mar 2025 15:37  Patient On (Oxygen Delivery Method): room air      I&O's Summary    06 Mar 2025 07:01  -  07 Mar 2025 07:00  --------------------------------------------------------  IN: 400 mL / OUT: 1600 mL / NET: -1200 mL    PHYSICAL EXAM:  Gen: Reclining in bed at time of exam, appears stated age  HEENT: MMM, clear OP  Neck: trachea at midline  CV: RRR, +S1/S2  Pulm: adequate respiratory effort, no increase in work of breathing  Abd: soft, ND  Skin: warm and dry,  Ext: no LE edema   Neuro: Alert, oriented, speaking in full sentences   Psych: affect and behavior appropriate, pleasant at time of interview    BASIC LABS:                        12.1   13.54 )-----------( 281      ( 07 Mar 2025 08:09 )             35.7     03-07    140  |  103  |  7   ----------------------------<  109[H]  4.3   |  26  |  0.50    Ca    9.4      07 Mar 2025 08:09        Urinalysis Basic - ( 07 Mar 2025 08:09 )    Color: x / Appearance: x / SG: x / pH: x  Gluc: 109 mg/dL / Ketone: x  / Bili: x / Urobili: x   Blood: x / Protein: x / Nitrite: x   Leuk Esterase: x / RBC: x / WBC x   Sq Epi: x / Non Sq Epi: x / Bacteria: x      CAPILLARY BLOOD GLUCOSE          OTHER LABS:        MICRODATA:      IMAGING:    EKG:    #Diet - Diet, Regular (03-06-25 @ 08:43) [Active]        #DVT PPx -

## 2025-03-07 NOTE — PROGRESS NOTE ADULT - SUBJECTIVE AND OBJECTIVE BOX
Procedure: LEFT THR   Surgeon: DUSHEY     pain controlled in L hip   Denies CP, SOB, N/V.    Vital Signs Last 24 Hrs  T(C): 36.2 (06 Mar 2025 14:35), Max: 36.7 (06 Mar 2025 10:48)  T(F): 97.2 (06 Mar 2025 14:35), Max: 98.1 (06 Mar 2025 14:05)  HR: 70 (06 Mar 2025 15:53) (68 - 76)  BP: 108/56 (06 Mar 2025 14:35) (95/64 - 133/75)  BP(mean): 79 (06 Mar 2025 14:35) (72 - 84)  RR: 20 (06 Mar 2025 15:53) (13 - 20)  SpO2: 99% (06 Mar 2025 15:53) (93% - 100%)    Parameters below as of 06 Mar 2025 14:35  Patient On (Oxygen Delivery Method): room air        General: NAD   Dressing  C/D/I aquacel in place   Pulses: DP/PT 2+ B/L LES  SLT: intact  B/L LES  Motor:  EHL/FHL/TA/GS  5/5 b/l les           Post op XR: prosthesis in place     A/P: 69yoFemale POD#1 s/p LEFT THR   - Stable  - Pain Control  - DVT ppx: ASA   - Post op abx: ANCEF   - PT, WBS: WBAT B/L LES  - F/U AM Labs  - dispo HPt pending clearance 
Orthopedics Post Op Check    Procedure: LEFT THR   Surgeon: DAMIR     Pt. stable, states she still has a little bit of tingling in her legs otherwise doing well.   Denies CP, SOB, N/V.    Vital Signs Last 24 Hrs  T(C): 36.2 (06 Mar 2025 14:35), Max: 36.7 (06 Mar 2025 10:48)  T(F): 97.2 (06 Mar 2025 14:35), Max: 98.1 (06 Mar 2025 14:05)  HR: 70 (06 Mar 2025 15:53) (68 - 76)  BP: 108/56 (06 Mar 2025 14:35) (95/64 - 133/75)  BP(mean): 79 (06 Mar 2025 14:35) (72 - 84)  RR: 20 (06 Mar 2025 15:53) (13 - 20)  SpO2: 99% (06 Mar 2025 15:53) (93% - 100%)    Parameters below as of 06 Mar 2025 14:35  Patient On (Oxygen Delivery Method): room air        General: NAD   Dressing  C/D/I aquacel in place   Pulses: DP/PT 2+ B/L LES  SLT: intact  B/L LES  Motor:  EHL/FHL/TA/GS  5/5 b/l les           Post op XR: prosthesis in place     A/P: 69yoFemale POD#0 s/p LEFT THR   - Stable  - Pain Control  - DVT ppx: ASA   - Post op abx: ANCEF   - PT, WBS: WBAT B/L LES  - F/U AM Labs

## 2025-03-07 NOTE — DISCHARGE NOTE PROVIDER - HOSPITAL COURSE
Admit to Orthopaedics 3/6/25 for left total hip replacement   Perioperative Antibiotics  DVT prophylaxis  Physical Therapy  Pain Management   Medical Co management

## 2025-03-07 NOTE — CONSULT NOTE ADULT - ASSESSMENT
69 year old woman with minimal medical history in our EMR, presenting for elective left NUNO.     # Osteoarthritis of the left hip   # s/p Left hip NUNO   s/p left hip NUNO on March 6th 2025     # post-operative state  OOTB to chair   Encourage ambulation   Encourage incentive spirometry   Recommend miralax qd + senna 2 tabs qHS for bowel regimen    # Anxiety   Resume home clonazepam on discharge.     # DVT ppx post op : Aspirin

## 2025-03-11 ENCOUNTER — TRANSCRIPTION ENCOUNTER (OUTPATIENT)
Age: 70
End: 2025-03-11

## 2025-03-11 ENCOUNTER — NON-APPOINTMENT (OUTPATIENT)
Age: 70
End: 2025-03-11

## 2025-03-11 ENCOUNTER — APPOINTMENT (OUTPATIENT)
Dept: CARE COORDINATION | Facility: HOME HEALTH | Age: 70
End: 2025-03-11

## 2025-03-11 PROBLEM — E78.5 HYPERLIPIDEMIA, UNSPECIFIED: Chronic | Status: ACTIVE | Noted: 2025-03-05

## 2025-03-13 ENCOUNTER — TRANSCRIPTION ENCOUNTER (OUTPATIENT)
Age: 70
End: 2025-03-13

## 2025-03-18 DIAGNOSIS — E78.5 HYPERLIPIDEMIA, UNSPECIFIED: ICD-10-CM

## 2025-03-18 DIAGNOSIS — M16.12 UNILATERAL PRIMARY OSTEOARTHRITIS, LEFT HIP: ICD-10-CM

## 2025-03-18 DIAGNOSIS — F41.9 ANXIETY DISORDER, UNSPECIFIED: ICD-10-CM

## 2025-03-20 ENCOUNTER — TRANSCRIPTION ENCOUNTER (OUTPATIENT)
Age: 70
End: 2025-03-20

## (undated) DEVICE — SAW BLADE STRYKER SAGITTAL WIDE MED

## (undated) DEVICE — ELCTR BOVIE PENCIL BLADE 10FT

## (undated) DEVICE — SUT ETHIBOND 1 30" OS8

## (undated) DEVICE — DRAPE IOBAN 33" X 23"

## (undated) DEVICE — DRILL BIT BRASSELER USA TWIST 1.6MM

## (undated) DEVICE — DRSG ALLEVYN LIFE 6 X 6 (PINK)

## (undated) DEVICE — GLV 8 PROTEXIS (WHITE)

## (undated) DEVICE — NDL SPINAL 18G X 3.5" (PINK)

## (undated) DEVICE — SUT VICRYL 0 36" CT-1 UNDYED

## (undated) DEVICE — GLV 7.5 DERMAPRENE ULTRA

## (undated) DEVICE — PREP DURAPREP 26CC

## (undated) DEVICE — WOUND IRR IRRISEPT W 0.5 CHG

## (undated) DEVICE — ELCTR GROUNDING PAD ADULT COVIDIEN

## (undated) DEVICE — DRAPE U (BLUE) 60 X 72"

## (undated) DEVICE — ELCTR STRYKER NEPTUNE SMOKE EVACUATION PENCIL (GREEN)

## (undated) DEVICE — SUT RETRIEVER LASSO HOOK

## (undated) DEVICE — SUT QUILL MONODERM 2-0 60CM 24MM UNDYED

## (undated) DEVICE — GLV 7.5 PROTEXIS (WHITE)

## (undated) DEVICE — PACK TOTAL HIP

## (undated) DEVICE — DRAPE 3/4 SHEET 52X76"

## (undated) DEVICE — DRSG COBAN 6"

## (undated) DEVICE — TUBING SMOKE EVAC 3/8" X 10FT FOR NEPTUNE

## (undated) DEVICE — DRAPE TOWEL BLUE 17" X 24"

## (undated) DEVICE — DRSG AQUACEL 3.5 X 10"

## (undated) DEVICE — DRAPE LIGHT HANDLE COVER (GREEN)

## (undated) DEVICE — POSITIONER PILLOW WHITE 18X24" DISP

## (undated) DEVICE — HOOD T7 PLUS PEELAWAY

## (undated) DEVICE — DRAPE ISOLATION W INCISE FILM & POUCH

## (undated) DEVICE — DRSG STOCKINETTE IMPERVIOUS XL 12 X 48"

## (undated) DEVICE — DRAPE STERI-DRAPE INCISE 32X33"

## (undated) DEVICE — SUT VICRYL 2-0 27" CT-2 UNDYED

## (undated) DEVICE — SUT STRATAFIX SYMMETRIC PDS PLUS 1 60CM CT-1 VIOLET

## (undated) DEVICE — DRAPE 1/2 SHEET 40X57"